# Patient Record
Sex: FEMALE | Race: WHITE | NOT HISPANIC OR LATINO
[De-identification: names, ages, dates, MRNs, and addresses within clinical notes are randomized per-mention and may not be internally consistent; named-entity substitution may affect disease eponyms.]

---

## 2020-10-29 ENCOUNTER — FORM ENCOUNTER (OUTPATIENT)
Age: 35
End: 2020-10-29

## 2020-11-05 ENCOUNTER — FORM ENCOUNTER (OUTPATIENT)
Age: 35
End: 2020-11-05

## 2020-11-29 ENCOUNTER — FORM ENCOUNTER (OUTPATIENT)
Age: 35
End: 2020-11-29

## 2020-12-10 ENCOUNTER — FORM ENCOUNTER (OUTPATIENT)
Age: 35
End: 2020-12-10

## 2021-01-17 ENCOUNTER — FORM ENCOUNTER (OUTPATIENT)
Age: 36
End: 2021-01-17

## 2021-01-26 ENCOUNTER — FORM ENCOUNTER (OUTPATIENT)
Age: 36
End: 2021-01-26

## 2021-03-18 ENCOUNTER — FORM ENCOUNTER (OUTPATIENT)
Age: 36
End: 2021-03-18

## 2021-03-31 ENCOUNTER — FORM ENCOUNTER (OUTPATIENT)
Age: 36
End: 2021-03-31

## 2021-05-05 ENCOUNTER — FORM ENCOUNTER (OUTPATIENT)
Age: 36
End: 2021-05-05

## 2021-05-19 ENCOUNTER — FORM ENCOUNTER (OUTPATIENT)
Age: 36
End: 2021-05-19

## 2021-05-26 ENCOUNTER — FORM ENCOUNTER (OUTPATIENT)
Age: 36
End: 2021-05-26

## 2021-06-02 ENCOUNTER — FORM ENCOUNTER (OUTPATIENT)
Age: 36
End: 2021-06-02

## 2021-06-03 ENCOUNTER — FORM ENCOUNTER (OUTPATIENT)
Age: 36
End: 2021-06-03

## 2021-06-04 ENCOUNTER — RESULT REVIEW (OUTPATIENT)
Age: 36
End: 2021-06-04

## 2021-06-05 ENCOUNTER — INPATIENT (INPATIENT)
Facility: HOSPITAL | Age: 36
LOS: 2 days | Discharge: HOME | End: 2021-06-08
Attending: OBSTETRICS & GYNECOLOGY | Admitting: OBSTETRICS & GYNECOLOGY
Payer: COMMERCIAL

## 2021-06-05 ENCOUNTER — OUTPATIENT (OUTPATIENT)
Dept: INPATIENT UNIT | Facility: HOSPITAL | Age: 36
LOS: 1 days | Discharge: HOME | End: 2021-06-05
Payer: COMMERCIAL

## 2021-06-05 VITALS — HEIGHT: 66 IN | WEIGHT: 160.94 LBS

## 2021-06-05 DIAGNOSIS — O47.9 FALSE LABOR, UNSPECIFIED: ICD-10-CM

## 2021-06-05 DIAGNOSIS — Z3A.00 WEEKS OF GESTATION OF PREGNANCY NOT SPECIFIED: ICD-10-CM

## 2021-06-05 LAB
ALBUMIN SERPL ELPH-MCNC: 2.8 G/DL — LOW (ref 3.3–5)
ALP SERPL-CCNC: 120 U/L — SIGNIFICANT CHANGE UP (ref 40–120)
ALT FLD-CCNC: 23 U/L — SIGNIFICANT CHANGE UP (ref 12–78)
ANION GAP SERPL CALC-SCNC: 6 MMOL/L — SIGNIFICANT CHANGE UP (ref 5–17)
AST SERPL-CCNC: 19 U/L — SIGNIFICANT CHANGE UP (ref 15–37)
BASOPHILS # BLD AUTO: 0.03 K/UL — SIGNIFICANT CHANGE UP (ref 0–0.2)
BASOPHILS NFR BLD AUTO: 0.2 % — SIGNIFICANT CHANGE UP (ref 0–2)
BILIRUB SERPL-MCNC: 0.5 MG/DL — SIGNIFICANT CHANGE UP (ref 0.2–1.2)
BUN SERPL-MCNC: 10 MG/DL — SIGNIFICANT CHANGE UP (ref 7–23)
CALCIUM SERPL-MCNC: 9.2 MG/DL — SIGNIFICANT CHANGE UP (ref 8.5–10.1)
CHLORIDE SERPL-SCNC: 106 MMOL/L — SIGNIFICANT CHANGE UP (ref 96–108)
CO2 SERPL-SCNC: 26 MMOL/L — SIGNIFICANT CHANGE UP (ref 22–31)
CREAT SERPL-MCNC: 0.51 MG/DL — SIGNIFICANT CHANGE UP (ref 0.5–1.3)
EOSINOPHIL # BLD AUTO: 0.09 K/UL — SIGNIFICANT CHANGE UP (ref 0–0.5)
EOSINOPHIL NFR BLD AUTO: 0.7 % — SIGNIFICANT CHANGE UP (ref 0–6)
GLUCOSE SERPL-MCNC: 93 MG/DL — SIGNIFICANT CHANGE UP (ref 70–99)
HCT VFR BLD CALC: 37.3 % — SIGNIFICANT CHANGE UP (ref 34.5–45)
HCT VFR BLD CALC: 38.1 % — SIGNIFICANT CHANGE UP (ref 34.5–45)
HGB BLD-MCNC: 12.7 G/DL — SIGNIFICANT CHANGE UP (ref 11.5–15.5)
HGB BLD-MCNC: 12.9 G/DL — SIGNIFICANT CHANGE UP (ref 11.5–15.5)
IMM GRANULOCYTES NFR BLD AUTO: 0.9 % — SIGNIFICANT CHANGE UP (ref 0–1.5)
LYMPHOCYTES # BLD AUTO: 1.85 K/UL — SIGNIFICANT CHANGE UP (ref 1–3.3)
LYMPHOCYTES # BLD AUTO: 14.4 % — SIGNIFICANT CHANGE UP (ref 13–44)
MCHC RBC-ENTMCNC: 30 PG — SIGNIFICANT CHANGE UP (ref 27–34)
MCHC RBC-ENTMCNC: 30 PG — SIGNIFICANT CHANGE UP (ref 27–34)
MCHC RBC-ENTMCNC: 33.9 GM/DL — SIGNIFICANT CHANGE UP (ref 32–36)
MCHC RBC-ENTMCNC: 34 GM/DL — SIGNIFICANT CHANGE UP (ref 32–36)
MCV RBC AUTO: 88.2 FL — SIGNIFICANT CHANGE UP (ref 80–100)
MCV RBC AUTO: 88.6 FL — SIGNIFICANT CHANGE UP (ref 80–100)
MONOCYTES # BLD AUTO: 1.14 K/UL — HIGH (ref 0–0.9)
MONOCYTES NFR BLD AUTO: 8.9 % — SIGNIFICANT CHANGE UP (ref 2–14)
NEUTROPHILS # BLD AUTO: 9.61 K/UL — HIGH (ref 1.8–7.4)
NEUTROPHILS NFR BLD AUTO: 74.9 % — SIGNIFICANT CHANGE UP (ref 43–77)
PLATELET # BLD AUTO: 210 K/UL — SIGNIFICANT CHANGE UP (ref 150–400)
PLATELET # BLD AUTO: 214 K/UL — SIGNIFICANT CHANGE UP (ref 150–400)
POTASSIUM SERPL-MCNC: 3.6 MMOL/L — SIGNIFICANT CHANGE UP (ref 3.5–5.3)
POTASSIUM SERPL-SCNC: 3.6 MMOL/L — SIGNIFICANT CHANGE UP (ref 3.5–5.3)
PROT SERPL-MCNC: 6.6 GM/DL — SIGNIFICANT CHANGE UP (ref 6–8.3)
RBC # BLD: 4.23 M/UL — SIGNIFICANT CHANGE UP (ref 3.8–5.2)
RBC # BLD: 4.3 M/UL — SIGNIFICANT CHANGE UP (ref 3.8–5.2)
RBC # FLD: 13.1 % — SIGNIFICANT CHANGE UP (ref 10.3–14.5)
RBC # FLD: 13.1 % — SIGNIFICANT CHANGE UP (ref 10.3–14.5)
SARS-COV-2 RNA SPEC QL NAA+PROBE: SIGNIFICANT CHANGE UP
SODIUM SERPL-SCNC: 138 MMOL/L — SIGNIFICANT CHANGE UP (ref 135–145)
WBC # BLD: 12.04 K/UL — HIGH (ref 3.8–10.5)
WBC # BLD: 12.84 K/UL — HIGH (ref 3.8–10.5)
WBC # FLD AUTO: 12.04 K/UL — HIGH (ref 3.8–10.5)
WBC # FLD AUTO: 12.84 K/UL — HIGH (ref 3.8–10.5)

## 2021-06-05 PROCEDURE — 86850 RBC ANTIBODY SCREEN: CPT

## 2021-06-05 PROCEDURE — G0463: CPT

## 2021-06-05 PROCEDURE — 36415 COLL VENOUS BLD VENIPUNCTURE: CPT

## 2021-06-05 PROCEDURE — 59050 FETAL MONITOR W/REPORT: CPT

## 2021-06-05 PROCEDURE — 86780 TREPONEMA PALLIDUM: CPT

## 2021-06-05 PROCEDURE — 88307 TISSUE EXAM BY PATHOLOGIST: CPT

## 2021-06-05 PROCEDURE — 59025 FETAL NON-STRESS TEST: CPT

## 2021-06-05 PROCEDURE — 85027 COMPLETE CBC AUTOMATED: CPT

## 2021-06-05 PROCEDURE — 85025 COMPLETE CBC W/AUTO DIFF WBC: CPT

## 2021-06-05 PROCEDURE — 86901 BLOOD TYPING SEROLOGIC RH(D): CPT

## 2021-06-05 PROCEDURE — 86769 SARS-COV-2 COVID-19 ANTIBODY: CPT

## 2021-06-05 PROCEDURE — 80053 COMPREHEN METABOLIC PANEL: CPT

## 2021-06-05 PROCEDURE — 76805 OB US >/= 14 WKS SNGL FETUS: CPT

## 2021-06-05 PROCEDURE — 88307 TISSUE EXAM BY PATHOLOGIST: CPT | Mod: 26

## 2021-06-05 PROCEDURE — 87635 SARS-COV-2 COVID-19 AMP PRB: CPT

## 2021-06-05 PROCEDURE — 76805 OB US >/= 14 WKS SNGL FETUS: CPT | Mod: 26

## 2021-06-05 PROCEDURE — 86900 BLOOD TYPING SEROLOGIC ABO: CPT

## 2021-06-05 RX ORDER — SODIUM CHLORIDE 9 MG/ML
1000 INJECTION, SOLUTION INTRAVENOUS ONCE
Refills: 0 | Status: COMPLETED | OUTPATIENT
Start: 2021-06-05 | End: 2021-06-05

## 2021-06-05 RX ORDER — ENOXAPARIN SODIUM 100 MG/ML
40 INJECTION SUBCUTANEOUS EVERY 24 HOURS
Refills: 0 | Status: DISCONTINUED | OUTPATIENT
Start: 2021-06-06 | End: 2021-06-08

## 2021-06-05 RX ORDER — MAGNESIUM HYDROXIDE 400 MG/1
30 TABLET, CHEWABLE ORAL
Refills: 0 | Status: DISCONTINUED | OUTPATIENT
Start: 2021-06-05 | End: 2021-06-08

## 2021-06-05 RX ORDER — LANOLIN
1 OINTMENT (GRAM) TOPICAL EVERY 6 HOURS
Refills: 0 | Status: DISCONTINUED | OUTPATIENT
Start: 2021-06-05 | End: 2021-06-08

## 2021-06-05 RX ORDER — ACETAMINOPHEN 500 MG
975 TABLET ORAL
Refills: 0 | Status: DISCONTINUED | OUTPATIENT
Start: 2021-06-05 | End: 2021-06-08

## 2021-06-05 RX ORDER — OXYCODONE HYDROCHLORIDE 5 MG/1
5 TABLET ORAL ONCE
Refills: 0 | Status: DISCONTINUED | OUTPATIENT
Start: 2021-06-05 | End: 2021-06-08

## 2021-06-05 RX ORDER — VANCOMYCIN HCL 1 G
1500 VIAL (EA) INTRAVENOUS ONCE
Refills: 0 | Status: COMPLETED | OUTPATIENT
Start: 2021-06-05 | End: 2021-06-05

## 2021-06-05 RX ORDER — NALOXONE HYDROCHLORIDE 4 MG/.1ML
0.1 SPRAY NASAL
Refills: 0 | Status: DISCONTINUED | OUTPATIENT
Start: 2021-06-05 | End: 2021-06-08

## 2021-06-05 RX ORDER — OXYCODONE HYDROCHLORIDE 5 MG/1
5 TABLET ORAL
Refills: 0 | Status: DISCONTINUED | OUTPATIENT
Start: 2021-06-05 | End: 2021-06-08

## 2021-06-05 RX ORDER — IBUPROFEN 200 MG
600 TABLET ORAL EVERY 6 HOURS
Refills: 0 | Status: COMPLETED | OUTPATIENT
Start: 2021-06-05 | End: 2022-05-04

## 2021-06-05 RX ORDER — KETOROLAC TROMETHAMINE 30 MG/ML
30 SYRINGE (ML) INJECTION EVERY 6 HOURS
Refills: 0 | Status: DISCONTINUED | OUTPATIENT
Start: 2021-06-05 | End: 2021-06-06

## 2021-06-05 RX ORDER — OXYTOCIN 10 UNIT/ML
333.33 VIAL (ML) INJECTION
Qty: 20 | Refills: 0 | Status: DISCONTINUED | OUTPATIENT
Start: 2021-06-05 | End: 2021-06-08

## 2021-06-05 RX ORDER — ACETAMINOPHEN 500 MG
1000 TABLET ORAL ONCE
Refills: 0 | Status: COMPLETED | OUTPATIENT
Start: 2021-06-05 | End: 2021-06-05

## 2021-06-05 RX ORDER — SODIUM CHLORIDE 9 MG/ML
1000 INJECTION, SOLUTION INTRAVENOUS
Refills: 0 | Status: DISCONTINUED | OUTPATIENT
Start: 2021-06-05 | End: 2021-06-06

## 2021-06-05 RX ORDER — ONDANSETRON 8 MG/1
4 TABLET, FILM COATED ORAL EVERY 6 HOURS
Refills: 0 | Status: DISCONTINUED | OUTPATIENT
Start: 2021-06-05 | End: 2021-06-08

## 2021-06-05 RX ORDER — DIPHENHYDRAMINE HCL 50 MG
25 CAPSULE ORAL EVERY 6 HOURS
Refills: 0 | Status: DISCONTINUED | OUTPATIENT
Start: 2021-06-05 | End: 2021-06-08

## 2021-06-05 RX ORDER — KETOROLAC TROMETHAMINE 30 MG/ML
15 SYRINGE (ML) INJECTION EVERY 6 HOURS
Refills: 0 | Status: DISCONTINUED | OUTPATIENT
Start: 2021-06-05 | End: 2021-06-05

## 2021-06-05 RX ORDER — CITRIC ACID/SODIUM CITRATE 300-500 MG
30 SOLUTION, ORAL ORAL ONCE
Refills: 0 | Status: COMPLETED | OUTPATIENT
Start: 2021-06-05 | End: 2021-06-05

## 2021-06-05 RX ORDER — SIMETHICONE 80 MG/1
80 TABLET, CHEWABLE ORAL EVERY 4 HOURS
Refills: 0 | Status: DISCONTINUED | OUTPATIENT
Start: 2021-06-05 | End: 2021-06-08

## 2021-06-05 RX ORDER — OXYCODONE HYDROCHLORIDE 5 MG/1
10 TABLET ORAL
Refills: 0 | Status: DISCONTINUED | OUTPATIENT
Start: 2021-06-05 | End: 2021-06-08

## 2021-06-05 RX ORDER — TETANUS TOXOID, REDUCED DIPHTHERIA TOXOID AND ACELLULAR PERTUSSIS VACCINE, ADSORBED 5; 2.5; 8; 8; 2.5 [IU]/.5ML; [IU]/.5ML; UG/.5ML; UG/.5ML; UG/.5ML
0.5 SUSPENSION INTRAMUSCULAR ONCE
Refills: 0 | Status: DISCONTINUED | OUTPATIENT
Start: 2021-06-05 | End: 2021-06-08

## 2021-06-05 RX ORDER — FAMOTIDINE 10 MG/ML
20 INJECTION INTRAVENOUS ONCE
Refills: 0 | Status: COMPLETED | OUTPATIENT
Start: 2021-06-05 | End: 2021-06-05

## 2021-06-05 RX ORDER — VANCOMYCIN HCL 1 G
2000 VIAL (EA) INTRAVENOUS ONCE
Refills: 0 | Status: DISCONTINUED | OUTPATIENT
Start: 2021-06-05 | End: 2021-06-05

## 2021-06-05 RX ORDER — AZITHROMYCIN 500 MG/1
500 TABLET, FILM COATED ORAL ONCE
Refills: 0 | Status: COMPLETED | OUTPATIENT
Start: 2021-06-05 | End: 2021-06-05

## 2021-06-05 RX ORDER — SODIUM CHLORIDE 9 MG/ML
1000 INJECTION, SOLUTION INTRAVENOUS
Refills: 0 | Status: DISCONTINUED | OUTPATIENT
Start: 2021-06-05 | End: 2021-06-08

## 2021-06-05 RX ORDER — HYDROMORPHONE HYDROCHLORIDE 2 MG/ML
1 INJECTION INTRAMUSCULAR; INTRAVENOUS; SUBCUTANEOUS
Refills: 0 | Status: DISCONTINUED | OUTPATIENT
Start: 2021-06-05 | End: 2021-06-08

## 2021-06-05 RX ADMIN — HYDROMORPHONE HYDROCHLORIDE 1 MILLIGRAM(S): 2 INJECTION INTRAMUSCULAR; INTRAVENOUS; SUBCUTANEOUS at 23:40

## 2021-06-05 RX ADMIN — SODIUM CHLORIDE 2000 MILLILITER(S): 9 INJECTION, SOLUTION INTRAVENOUS at 21:19

## 2021-06-05 RX ADMIN — FAMOTIDINE 20 MILLIGRAM(S): 10 INJECTION INTRAVENOUS at 21:40

## 2021-06-05 RX ADMIN — Medication 300 MILLIGRAM(S): at 21:36

## 2021-06-05 RX ADMIN — Medication 400 MILLIGRAM(S): at 23:51

## 2021-06-05 RX ADMIN — HYDROMORPHONE HYDROCHLORIDE 1 MILLIGRAM(S): 2 INJECTION INTRAMUSCULAR; INTRAVENOUS; SUBCUTANEOUS at 23:23

## 2021-06-05 RX ADMIN — Medication 30 MILLILITER(S): at 21:39

## 2021-06-06 ENCOUNTER — TRANSCRIPTION ENCOUNTER (OUTPATIENT)
Age: 36
End: 2021-06-06

## 2021-06-06 DIAGNOSIS — O26.619 LIVER AND BILIARY TRACT DISORDERS IN PREGNANCY, UNSPECIFIED TRIMESTER: ICD-10-CM

## 2021-06-06 LAB
BASOPHILS # BLD AUTO: 0.05 K/UL — SIGNIFICANT CHANGE UP (ref 0–0.2)
BASOPHILS NFR BLD AUTO: 0.2 % — SIGNIFICANT CHANGE UP (ref 0–2)
COVID-19 SPIKE DOMAIN AB INTERP: NEGATIVE — SIGNIFICANT CHANGE UP
COVID-19 SPIKE DOMAIN ANTIBODY RESULT: 0.4 U/ML — SIGNIFICANT CHANGE UP
EOSINOPHIL # BLD AUTO: 0 K/UL — SIGNIFICANT CHANGE UP (ref 0–0.5)
EOSINOPHIL NFR BLD AUTO: 0 % — SIGNIFICANT CHANGE UP (ref 0–6)
HCT VFR BLD CALC: 37.1 % — SIGNIFICANT CHANGE UP (ref 34.5–45)
HGB BLD-MCNC: 12.6 G/DL — SIGNIFICANT CHANGE UP (ref 11.5–15.5)
IMM GRANULOCYTES NFR BLD AUTO: 0.8 % — SIGNIFICANT CHANGE UP (ref 0–1.5)
LYMPHOCYTES # BLD AUTO: 0.97 K/UL — LOW (ref 1–3.3)
LYMPHOCYTES # BLD AUTO: 4.2 % — LOW (ref 13–44)
MCHC RBC-ENTMCNC: 29.7 PG — SIGNIFICANT CHANGE UP (ref 27–34)
MCHC RBC-ENTMCNC: 34 GM/DL — SIGNIFICANT CHANGE UP (ref 32–36)
MCV RBC AUTO: 87.5 FL — SIGNIFICANT CHANGE UP (ref 80–100)
MONOCYTES # BLD AUTO: 0.94 K/UL — HIGH (ref 0–0.9)
MONOCYTES NFR BLD AUTO: 4.1 % — SIGNIFICANT CHANGE UP (ref 2–14)
NEUTROPHILS # BLD AUTO: 20.71 K/UL — HIGH (ref 1.8–7.4)
NEUTROPHILS NFR BLD AUTO: 90.7 % — HIGH (ref 43–77)
PLATELET # BLD AUTO: 206 K/UL — SIGNIFICANT CHANGE UP (ref 150–400)
RBC # BLD: 4.24 M/UL — SIGNIFICANT CHANGE UP (ref 3.8–5.2)
RBC # FLD: 12.8 % — SIGNIFICANT CHANGE UP (ref 10.3–14.5)
SARS-COV-2 IGG+IGM SERPL QL IA: 0.4 U/ML — SIGNIFICANT CHANGE UP
SARS-COV-2 IGG+IGM SERPL QL IA: NEGATIVE — SIGNIFICANT CHANGE UP
WBC # BLD: 22.85 K/UL — HIGH (ref 3.8–10.5)
WBC # FLD AUTO: 22.85 K/UL — HIGH (ref 3.8–10.5)

## 2021-06-06 RX ORDER — IBUPROFEN 200 MG
600 TABLET ORAL EVERY 6 HOURS
Refills: 0 | Status: DISCONTINUED | OUTPATIENT
Start: 2021-06-06 | End: 2021-06-08

## 2021-06-06 RX ADMIN — Medication 30 MILLIGRAM(S): at 14:59

## 2021-06-06 RX ADMIN — Medication 30 MILLIGRAM(S): at 21:40

## 2021-06-06 RX ADMIN — ENOXAPARIN SODIUM 40 MILLIGRAM(S): 100 INJECTION SUBCUTANEOUS at 12:28

## 2021-06-06 RX ADMIN — Medication 30 MILLIGRAM(S): at 08:49

## 2021-06-06 RX ADMIN — Medication 1000 MILLIGRAM(S): at 00:07

## 2021-06-06 RX ADMIN — Medication 30 MILLIGRAM(S): at 21:10

## 2021-06-06 RX ADMIN — Medication 975 MILLIGRAM(S): at 17:30

## 2021-06-06 RX ADMIN — SODIUM CHLORIDE 125 MILLILITER(S): 9 INJECTION, SOLUTION INTRAVENOUS at 04:07

## 2021-06-06 RX ADMIN — Medication 1 TABLET(S): at 12:28

## 2021-06-06 RX ADMIN — Medication 30 MILLIGRAM(S): at 01:35

## 2021-06-06 RX ADMIN — Medication 975 MILLIGRAM(S): at 17:50

## 2021-06-06 RX ADMIN — Medication 30 MILLIGRAM(S): at 09:30

## 2021-06-06 RX ADMIN — Medication 975 MILLIGRAM(S): at 12:28

## 2021-06-06 RX ADMIN — Medication 30 MILLIGRAM(S): at 01:15

## 2021-06-06 RX ADMIN — Medication 975 MILLIGRAM(S): at 12:29

## 2021-06-06 RX ADMIN — Medication 30 MILLIGRAM(S): at 14:57

## 2021-06-06 NOTE — DISCHARGE NOTE OB - CARE PROVIDER_API CALL
Emmanuel Talbert)  Obstetrics and Gynecology  [address] [place] [address]  Phone: [phone]  Fax: [phone]  Established Patient  Follow Up Time:

## 2021-06-06 NOTE — PROGRESS NOTE ADULT - ATTENDING COMMENTS
POD#1 s/p csection.   vs wnl  abdomen soft, dressing clean dry  minimal lochial flow   no calf tenderness  continue same management

## 2021-06-06 NOTE — DISCHARGE NOTE OB - CARE PLAN
Principal Discharge DX:	 delivery delivered  Goal:	recovery  Assessment and plan of treatment:	discussed  Secondary Diagnosis:	Intrahepatic cholestasis of pregnancy  Goal:	recovery  Assessment and plan of treatment:	discussed

## 2021-06-06 NOTE — PROGRESS NOTE ADULT - ASSESSMENT
A/P:  36yo   now POD#1 s/p urgent primary  section 2/ to breech presentation in labor at 37 5/7 weeks gestation complicated by ineffective spinal anesthesia converted to general anesthesia. This pregnancy was complicated by suspected intrahepatic cholestasis of pregnancy  -Vital Signs Stable  -Voiding, tolerating PO, bowel function nml   -Advance care as tolerated   -Continue routine postpartum/postoperative care and education.  -Healthy female infant A/P:  36yo   now POD#1 s/p urgent primary  section 2/ to breech presentation in labor at 37 5/7 weeks gestation complicated by ineffective spinal anesthesia converted to general anesthesia. This pregnancy was complicated by suspected intrahepatic cholestasis of pregnancy  -Vital Signs Stable  -Void pending, tolerating PO fluids, will attempt regular diet this AM, bowel function nml   -Advance care as tolerated   -Continue routine postpartum/postoperative care and education.  -Healthy female infant A/P:  34yo   now POD#1 s/p urgent primary  section 2/ to breech presentation in labor at 37 5/7 weeks gestation complicated by ineffective spinal anesthesia converted to general anesthesia. This pregnancy was complicated by suspected intrahepatic cholestasis of pregnancy  -Vital Signs Stable  -Void pending, tolerating PO fluids, will attempt regular diet this AM, bowel function nml   -Advance care as tolerated   -Continue routine postpartum/postoperative care and education.  -Healthy female infant    Agree with above

## 2021-06-06 NOTE — DISCHARGE NOTE OB - PATIENT PORTAL LINK FT
You can access the FollowMyHealth Patient Portal offered by [facility] by registering at the following website: [email or web address]. By joining [employer]’s FollowMyHealth portal, you will also be able to view your health information using other applications (apps) compatible with our system.

## 2021-06-06 NOTE — PROGRESS NOTE ADULT - SUBJECTIVE AND OBJECTIVE BOX
ELSA REYES is a 34yo   now POD#1 s/p urgent primary  section / to breech presentation in labor at 37 5/7 weeks gestation complicated by ineffective spinal anesthesia converted to general anesthesia. This pregnancy was complicated by suspected intrahepatic cholestasis of pregnancy    S:    The patient has no complaints. Pain controlled with medication   She is not yet ambulating.   She is tolerating PO fluids, but has not attempted a regular diet  - flatus/-BM  Foster catheter removed, void pending     O:    T(C): 37.1 (21 @ 06:30), Max: 37.1 (21 @ 06:30)  HR: 65 (21 @ 06:30) (60 - 85)  BP: 90/50 (21 @ 06:30) (90/50 - 112/67)  RR: 16 (21 @ 06:30) (16 - 20)  SpO2: 99% (21 @ 06:30) (97% - 100%)  Wt(kg): --    Gen: NAD, AOx3  CV: RRR  Pulm: CTAB  Breast: nontender, not engorged   Abdomen:  soft, appropriately tender, non-distended, +bowel sounds.  Uterus:  Fundus firm below umbilicus  Incision:  Clean/dry/intact with dermabond in place  VE:  +lochia  Ext:  Non-tender, no edema                           12.6   22.85 )-----------( 206      ( 2021 07:18 )             37.1     06-05    138  |  106  |  10  ----------------------------<  93  3.6   |  26  |  0.51    Ca    9.2      2021 02:07    TPro  6.6  /  Alb  2.8<L>  /  TBili  0.5  /  DBili  x   /  AST  19  /  ALT  23  /  AlkPhos  120  06-05         ELSA REYES is a 36yo   now POD#1 s/p urgent primary  section / to breech presentation in labor at 37 5/7 weeks gestation complicated by ineffective spinal anesthesia converted to general anesthesia. This pregnancy was complicated by suspected intrahepatic cholestasis of pregnancy    S:    The patient has no complaints. Pain controlled with medication   She is not yet ambulating.   She is tolerating PO fluids, but has not attempted a regular diet  - flatus/-BM  Foster catheter removed, void pending     O:    T(C): 37.1 (21 @ 06:30), Max: 37.1 (21 @ 06:30)  HR: 65 (21 @ 06:30) (60 - 85)  BP: 90/50 (21 @ 06:30) (90/50 - 112/67)  RR: 16 (21 @ 06:30) (16 - 20)  SpO2: 99% (21 @ 06:30) (97% - 100%)  Wt(kg): --    Gen: NAD, AOx3  CV: RRR  Pulm: CTAB  Breast: nontender, not engorged   Abdomen:  soft, appropriately tender, non-distended, +bowel sounds.  Uterus:  Fundus firm below umbilicus  Incision:  Clean/dry/intact with dermabond in place  VE:  +lochia  Ext:  Non-tender, no edema                           12.6   22.85 )-----------( 206      ( 2021 07:18 )             37.1     06-05    138  |  106  |  10  ----------------------------<  93  3.6   |  26  |  0.51    Ca    9.2      2021 02:07    TPro  6.6  /  Alb  2.8<L>  /  TBili  0.5  /  DBili  x   /  AST  19  /  ALT  23  /  AlkPhos  120  06-05

## 2021-06-07 LAB — T PALLIDUM AB TITR SER: NEGATIVE — SIGNIFICANT CHANGE UP

## 2021-06-07 RX ADMIN — Medication 600 MILLIGRAM(S): at 09:21

## 2021-06-07 RX ADMIN — Medication 600 MILLIGRAM(S): at 21:00

## 2021-06-07 RX ADMIN — Medication 975 MILLIGRAM(S): at 06:33

## 2021-06-07 RX ADMIN — Medication 975 MILLIGRAM(S): at 15:32

## 2021-06-07 RX ADMIN — Medication 600 MILLIGRAM(S): at 03:37

## 2021-06-07 RX ADMIN — Medication 975 MILLIGRAM(S): at 00:05

## 2021-06-07 RX ADMIN — Medication 975 MILLIGRAM(S): at 01:00

## 2021-06-07 RX ADMIN — ENOXAPARIN SODIUM 40 MILLIGRAM(S): 100 INJECTION SUBCUTANEOUS at 09:18

## 2021-06-07 RX ADMIN — Medication 600 MILLIGRAM(S): at 04:30

## 2021-06-07 RX ADMIN — Medication 975 MILLIGRAM(S): at 07:24

## 2021-06-07 RX ADMIN — Medication 600 MILLIGRAM(S): at 21:25

## 2021-06-07 RX ADMIN — Medication 600 MILLIGRAM(S): at 09:18

## 2021-06-07 RX ADMIN — Medication 600 MILLIGRAM(S): at 14:50

## 2021-06-07 RX ADMIN — Medication 600 MILLIGRAM(S): at 14:22

## 2021-06-07 RX ADMIN — Medication 1 TABLET(S): at 09:18

## 2021-06-07 NOTE — PROGRESS NOTE ADULT - SUBJECTIVE AND OBJECTIVE BOX
Postpartum Note,  Section  She is a  35y woman who is now post-operative day:2     Subjective:  The patient feels well.  She is ambulating.   She is tolerating regular diet.  She denies nausea and vomiting.  She is voiding.  Her pain is controlled.  She reports normal postpartum bleeding.  She is breastfeeding.  She is formula feeding.    Physical exam:    Vital Signs Last 24 Hrs  T(C): 36.7 (2021 00:13), Max: 36.8 (2021 20:00)  T(F): 98 (2021 00:13), Max: 98.2 (2021 20:00)  HR: 74 (2021 00:13) (74 - 81)  BP: 107/56 (2021 00:13) (100/56 - 107/56)  BP(mean): --  RR: 18 (2021 00:13) (16 - 18)  SpO2: 98% (2021 00:13) (98% - 100%)    Gen: NAD  Breast: Soft, nontender, not engorged.  Abdomen: Soft, nontender, no distension , firm uterine fundus at umbilicus.  Incision: Clean, dry, and intact with steri strips  Pelvic: Normal lochia noted  Ext: No calf tenderness    LABS:                        12.6   22.85 )-----------( 206      ( 2021 07:18 )             37.1     penicillins (Unknown)    Intolerances      MEDICATIONS  (STANDING):  acetaminophen   Tablet .. 975 milliGRAM(s) Oral <User Schedule>  diphtheria/tetanus/pertussis (acellular) Vaccine (ADAcel) 0.5 milliLiter(s) IntraMuscular once  enoxaparin Injectable 40 milliGRAM(s) SubCutaneous every 24 hours  ibuprofen  Tablet. 600 milliGRAM(s) Oral every 6 hours  lactated ringers. 1000 milliLiter(s) (125 mL/Hr) IV Continuous <Continuous>  oxytocin Infusion 333.333 milliUNIT(s)/Min (1000 mL/Hr) IV Continuous <Continuous>  oxytocin Infusion 333.333 milliUNIT(s)/Min (1000 mL/Hr) IV Continuous <Continuous>  prenatal multivitamin 1 Tablet(s) Oral daily    MEDICATIONS  (PRN):  diphenhydrAMINE 25 milliGRAM(s) Oral every 6 hours PRN Pruritus  HYDROmorphone  Injectable 1 milliGRAM(s) IV Push every 3 hours PRN Severe Pain (7 - 10)  lanolin Ointment 1 Application(s) Topical every 6 hours PRN Sore Nipples  magnesium hydroxide Suspension 30 milliLiter(s) Oral two times a day PRN Constipation  naloxone Injectable 0.1 milliGRAM(s) IV Push every 3 minutes PRN For ANY of the following changes in patient status:  A. RR LESS THAN 10 breaths per minute, B. Oxygen saturation LESS THAN 90%, C. Sedation score of 6  ondansetron Injectable 4 milliGRAM(s) IV Push every 6 hours PRN Nausea  oxyCODONE    IR 5 milliGRAM(s) Oral every 3 hours PRN Mild Pain (1 - 3)  oxyCODONE    IR 10 milliGRAM(s) Oral every 3 hours PRN Moderate Pain (4 - 6)  oxyCODONE    IR 5 milliGRAM(s) Oral every 3 hours PRN Moderate to Severe Pain (4-10)  oxyCODONE    IR 5 milliGRAM(s) Oral once PRN Moderate to Severe Pain (4-10)  simethicone 80 milliGRAM(s) Chew every 4 hours PRN Gas        Assessment and Plan  POD #2 s/p C/S for breech under general anesthesia  Doing well.  Encourage ambulation.

## 2021-06-08 VITALS
TEMPERATURE: 98 F | SYSTOLIC BLOOD PRESSURE: 111 MMHG | DIASTOLIC BLOOD PRESSURE: 72 MMHG | OXYGEN SATURATION: 98 % | HEART RATE: 83 BPM | RESPIRATION RATE: 16 BRPM

## 2021-06-08 RX ADMIN — Medication 600 MILLIGRAM(S): at 10:10

## 2021-06-08 RX ADMIN — Medication 1 TABLET(S): at 09:36

## 2021-06-08 RX ADMIN — Medication 975 MILLIGRAM(S): at 00:27

## 2021-06-08 RX ADMIN — Medication 975 MILLIGRAM(S): at 06:35

## 2021-06-08 RX ADMIN — Medication 600 MILLIGRAM(S): at 09:36

## 2021-06-08 RX ADMIN — Medication 975 MILLIGRAM(S): at 00:06

## 2021-06-08 RX ADMIN — Medication 975 MILLIGRAM(S): at 06:10

## 2021-06-08 NOTE — PROGRESS NOTE ADULT - SUBJECTIVE AND OBJECTIVE BOX
Postpartum Note,  Section  She is a  35y woman who is now post-operative day: #2    Subjective:  The patient feels well.  She is ambulating without difficulty.  She is tolerating PO.  She is voiding.  She denies nausea and vomiting.  Her pain is controlled.  She reports normal postpartum bleeding  She is breastfeeding.    Physical exam:    Vital Signs Last 24 Hrs  T(C): 36.7 (2021 08:02), Max: 36.9 (2021 23:00)  T(F): 98.1 (2021 08:02), Max: 98.4 (2021 23:00)  HR: 83 (2021 08:02) (83 - 92)  BP: 111/72 (2021 08:02) (102/75 - 111/72)  BP(mean): --  RR: 16 (2021 08:02) (16 - 16)  SpO2: 98% (2021 08:02) (98% - 98%)    Gen: NAD  Breast: Soft, nontender, non engorged  Abdomen: Soft, nontender, no distension , firm uterine fundus at umbilicus.  Incision: Clean, dry, and intact  Pelvic: Normal lochia noted  Ext: No calf tenderness    LABS:    Allergies    penicillins (Unknown)    Intolerances      MEDICATIONS  (STANDING):  acetaminophen   Tablet .. 975 milliGRAM(s) Oral <User Schedule>  diphtheria/tetanus/pertussis (acellular) Vaccine (ADAcel) 0.5 milliLiter(s) IntraMuscular once  enoxaparin Injectable 40 milliGRAM(s) SubCutaneous every 24 hours  ibuprofen  Tablet. 600 milliGRAM(s) Oral every 6 hours  lactated ringers. 1000 milliLiter(s) (125 mL/Hr) IV Continuous <Continuous>  oxytocin Infusion 333.333 milliUNIT(s)/Min (1000 mL/Hr) IV Continuous <Continuous>  oxytocin Infusion 333.333 milliUNIT(s)/Min (1000 mL/Hr) IV Continuous <Continuous>  prenatal multivitamin 1 Tablet(s) Oral daily    MEDICATIONS  (PRN):  diphenhydrAMINE 25 milliGRAM(s) Oral every 6 hours PRN Pruritus  HYDROmorphone  Injectable 1 milliGRAM(s) IV Push every 3 hours PRN Severe Pain (7 - 10)  lanolin Ointment 1 Application(s) Topical every 6 hours PRN Sore Nipples  magnesium hydroxide Suspension 30 milliLiter(s) Oral two times a day PRN Constipation  naloxone Injectable 0.1 milliGRAM(s) IV Push every 3 minutes PRN For ANY of the following changes in patient status:  A. RR LESS THAN 10 breaths per minute, B. Oxygen saturation LESS THAN 90%, C. Sedation score of 6  ondansetron Injectable 4 milliGRAM(s) IV Push every 6 hours PRN Nausea  oxyCODONE    IR 5 milliGRAM(s) Oral every 3 hours PRN Mild Pain (1 - 3)  oxyCODONE    IR 10 milliGRAM(s) Oral every 3 hours PRN Moderate Pain (4 - 6)  oxyCODONE    IR 5 milliGRAM(s) Oral every 3 hours PRN Moderate to Severe Pain (4-10)  oxyCODONE    IR 5 milliGRAM(s) Oral once PRN Moderate to Severe Pain (4-10)  simethicone 80 milliGRAM(s) Chew every 4 hours PRN Gas        Assessment and Plan  POD # 2 s/p C/S  Doing well.  Encourage ambulation.  Discharge home today.  F/U in 2 weeks for incision check.  Call for fevers, chills, nausea, vomiting, heavy vaginal bleeding, vaginal discharge, severe pain, symptoms of depression, problems with incision or any other concerning symptoms.  Nothing in vagina and no heavy lifting x 6 weeks.  No driving x 2 weeks.

## 2021-06-14 ENCOUNTER — FORM ENCOUNTER (OUTPATIENT)
Age: 36
End: 2021-06-14

## 2021-06-14 DIAGNOSIS — Z3A.37 37 WEEKS GESTATION OF PREGNANCY: ICD-10-CM

## 2021-06-14 DIAGNOSIS — Z88.0 ALLERGY STATUS TO PENICILLIN: ICD-10-CM

## 2021-06-14 DIAGNOSIS — K83.1 OBSTRUCTION OF BILE DUCT: ICD-10-CM

## 2021-06-14 DIAGNOSIS — O34.29 MATERNAL CARE DUE TO UTERINE SCAR FROM OTHER PREVIOUS SURGERY: ICD-10-CM

## 2021-12-28 PROBLEM — Z00.00 ENCOUNTER FOR PREVENTIVE HEALTH EXAMINATION: Status: ACTIVE | Noted: 2021-12-28

## 2021-12-30 ENCOUNTER — APPOINTMENT (OUTPATIENT)
Dept: PEDIATRIC ALLERGY IMMUNOLOGY | Facility: CLINIC | Age: 36
End: 2021-12-30
Payer: COMMERCIAL

## 2021-12-30 VITALS
RESPIRATION RATE: 16 BRPM | HEART RATE: 90 BPM | OXYGEN SATURATION: 98 % | DIASTOLIC BLOOD PRESSURE: 85 MMHG | SYSTOLIC BLOOD PRESSURE: 125 MMHG | HEIGHT: 65 IN | TEMPERATURE: 98.1 F | WEIGHT: 153 LBS | BODY MASS INDEX: 25.49 KG/M2

## 2021-12-30 DIAGNOSIS — J30.9 ALLERGIC RHINITIS, UNSPECIFIED: ICD-10-CM

## 2021-12-30 DIAGNOSIS — L50.8 OTHER URTICARIA: ICD-10-CM

## 2021-12-30 PROCEDURE — 95004 PERQ TESTS W/ALRGNC XTRCS: CPT

## 2021-12-30 PROCEDURE — 99203 OFFICE O/P NEW LOW 30 MIN: CPT | Mod: 25

## 2021-12-30 RX ORDER — PNV NO.95/FERROUS FUM/FOLIC AC 28MG-0.8MG
TABLET ORAL
Refills: 0 | Status: ACTIVE | COMMUNITY

## 2021-12-30 RX ORDER — FEXOFENADINE HCL 180 MG
180 TABLET ORAL
Refills: 0 | Status: ACTIVE | COMMUNITY

## 2021-12-30 RX ORDER — ALBUTEROL SULFATE 90 UG/1
108 (90 BASE) INHALANT RESPIRATORY (INHALATION)
Refills: 0 | Status: ACTIVE | COMMUNITY

## 2021-12-30 NOTE — REASON FOR VISIT
[Initial Evaluation] : an initial evaluation of [Congestion] : congestion [Runny Nose] : runny nose [Itchy Eyes] : itchy eyes [Red Eyes] : red eyes [To Medication] : allergy to medication [Asthma] : asthma [Wheezing] : wheezing [Shortness of Breath] : shortness of breath [Hives] : hives

## 2021-12-30 NOTE — SOCIAL HISTORY
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

## 2021-12-30 NOTE — ASSESSMENT
I reviewed 's note with the pt's daughter. She prefers to have appt. appt given for tomorrow. Daughter knows that if the diarrhea worsens that pt will need to go to the ER. She agreed with this plan. Advised increased fluids and rest in the meantime. [FreeTextEntry1] : 36 yr old with new onset post pregnancy urticaria and dermatographia\par Diminished with Allegra 360 mg qd\par Increase AR from dog dander\par Previous eval from Dr. Douglass was normal\par \par Will send Imunocaps for Dog\par Will sent urticaria autoimmune eval\par Encouraged to receive COVID vaccination\par \par Pt to call in 2-3 weeks to let me know how she is doing. \par \par \par Total MD time spent on this encounter was 35 minutes.  This includes time devoted to preparing to see the patient with review of previous medical record, obtaining medical history, performing physical exam, counseling and patient education with patient and family, ordering medications and lab studies, documentation in the medical record and coordination of care.\par

## 2021-12-30 NOTE — PHYSICAL EXAM
[Alert] : alert [Well Nourished] : well nourished [No Discharge] : no discharge [Normal TMs] : both tympanic membranes were normal [No Thrush] : no thrush [Boggy Nasal Turbinates] : no boggy and/or pale nasal turbinates [Posterior Pharyngeal Cobblestoning] : no posterior pharyngeal cobblestoning [No Neck Mass] : no neck mass was observed [Normal Rate and Effort] : normal respiratory rhythm and effort [Wheezing] : no wheezing was heard [Normal Rate] : heart rate was normal  [Normal Cervical Lymph Nodes] : cervical [Skin Intact] : skin intact  [No Joint Swelling or Erythema] : no joint swelling or erythema

## 2021-12-30 NOTE — HISTORY OF PRESENT ILLNESS
[de-identified] : 36 yr  old with new onset chronic urticaria since delivery of child about 7 months ago. Hives started about 3 months after deliver and have been qd in nature. Digital images provided by patient were consistent with urticaria and dermatographia. She started Allegra 360 mg qAM and has done well but if she missed a dose hives return. Mild increase AR is also noted with 2 dogs at home with sneezing, rhinitis, post nasal drip and occasional wheezing. She was given an albuterol inhaler which she uses intermittently.,\par She saw Dr. Douglass who did some blood work but she does not know the results\par She has not had COVID and not the COVID vaccine

## 2022-03-07 ENCOUNTER — FORM ENCOUNTER (OUTPATIENT)
Age: 37
End: 2022-03-07

## 2022-07-06 ENCOUNTER — APPOINTMENT (OUTPATIENT)
Dept: OBGYN | Facility: CLINIC | Age: 37
End: 2022-07-06

## 2022-07-06 VITALS
RESPIRATION RATE: 12 BRPM | HEART RATE: 89 BPM | SYSTOLIC BLOOD PRESSURE: 120 MMHG | DIASTOLIC BLOOD PRESSURE: 78 MMHG | HEIGHT: 65 IN | WEIGHT: 150 LBS | BODY MASS INDEX: 24.99 KG/M2

## 2022-07-06 LAB — HCG UR QL: NEGATIVE

## 2022-07-06 PROCEDURE — 81025 URINE PREGNANCY TEST: CPT

## 2022-07-06 PROCEDURE — 57454 BX/CURETT OF CERVIX W/SCOPE: CPT

## 2022-07-06 NOTE — PROCEDURE
[Colposcopy] : Colposcopy  [Time out performed] : Pre-procedure time out performed.  Patient's name, date of birth and procedure confirmed. [Consent Obtained] : Consent obtained [Risks] : risks [Benefits] : benefits [Alternatives] : alternatives [Patient] : patient [Infection] : infection [Bleeding] : bleeding [Allergic Reaction] : allergic reaction [Abnormal Pap] : abnormal pap [Ibuprofen ___mg] : Ibuprofen ~Vmg [Colposcopy Adequate] : colposcopy adequate [Pap Performed] : pap not performed [SCI Fully Visualized] : SCI fully visualized [No Abnormalities] : no abnormalities [Lesion] : lesion seen [Biopsy] : biopsy taken [Hemostasis Obtained] : Hemostasis obtained [Tolerated Well] : the patient tolerated the procedure well [de-identified] : 1 [de-identified] : mild changes noted [de-identified] : 9412 [de-identified] : mild changes [de-identified] : mild changes\par check ECC and biopsy

## 2022-07-28 ENCOUNTER — NON-APPOINTMENT (OUTPATIENT)
Age: 37
End: 2022-07-28

## 2022-07-28 LAB — CORE LAB BIOPSY: NORMAL

## 2022-08-04 ENCOUNTER — NON-APPOINTMENT (OUTPATIENT)
Age: 37
End: 2022-08-04

## 2022-08-04 LAB
ALBUMIN SERPL ELPH-MCNC: 4.6 G/DL
ALP BLD-CCNC: 45 U/L
ALT SERPL-CCNC: 20 U/L
ANION GAP SERPL CALC-SCNC: 12 MMOL/L
AST SERPL-CCNC: 20 U/L
BASOPHILS # BLD AUTO: 0.08 K/UL
BASOPHILS NFR BLD AUTO: 0.8 %
BILIRUB SERPL-MCNC: 0.6 MG/DL
BUN SERPL-MCNC: 14 MG/DL
CALCIUM SERPL-MCNC: 9.5 MG/DL
CHLORIDE SERPL-SCNC: 101 MMOL/L
CO2 SERPL-SCNC: 24 MMOL/L
CREAT SERPL-MCNC: 0.64 MG/DL
EGFR: 117 ML/MIN/1.73M2
EOSINOPHIL # BLD AUTO: 0.41 K/UL
EOSINOPHIL NFR BLD AUTO: 4 %
GLUCOSE SERPL-MCNC: 80 MG/DL
HCT VFR BLD CALC: 45 %
HGB BLD-MCNC: 14.4 G/DL
IMM GRANULOCYTES NFR BLD AUTO: 0.3 %
LYMPHOCYTES # BLD AUTO: 2.71 K/UL
LYMPHOCYTES NFR BLD AUTO: 26.6 %
MAN DIFF?: NORMAL
MCHC RBC-ENTMCNC: 29.2 PG
MCHC RBC-ENTMCNC: 32 GM/DL
MCV RBC AUTO: 91.3 FL
MONOCYTES # BLD AUTO: 0.61 K/UL
MONOCYTES NFR BLD AUTO: 6 %
NEUTROPHILS # BLD AUTO: 6.34 K/UL
NEUTROPHILS NFR BLD AUTO: 62.3 %
PLATELET # BLD AUTO: 277 K/UL
POTASSIUM SERPL-SCNC: 4.3 MMOL/L
PROT SERPL-MCNC: 7.3 G/DL
RBC # BLD: 4.93 M/UL
RBC # FLD: 12.7 %
SODIUM SERPL-SCNC: 137 MMOL/L
WBC # FLD AUTO: 10.18 K/UL

## 2022-08-08 ENCOUNTER — RESULT REVIEW (OUTPATIENT)
Age: 37
End: 2022-08-08

## 2022-08-08 PROCEDURE — 57520 CONIZATION OF CERVIX: CPT

## 2022-09-30 ENCOUNTER — APPOINTMENT (OUTPATIENT)
Dept: OBGYN | Facility: CLINIC | Age: 37
End: 2022-09-30

## 2022-09-30 VITALS
RESPIRATION RATE: 12 BRPM | WEIGHT: 155 LBS | HEART RATE: 86 BPM | DIASTOLIC BLOOD PRESSURE: 74 MMHG | SYSTOLIC BLOOD PRESSURE: 120 MMHG | HEIGHT: 65 IN | OXYGEN SATURATION: 98 % | BODY MASS INDEX: 25.83 KG/M2

## 2022-09-30 DIAGNOSIS — Z87.410 PERSONAL HISTORY OF CERVICAL DYSPLASIA: ICD-10-CM

## 2022-09-30 PROCEDURE — 99024 POSTOP FOLLOW-UP VISIT: CPT

## 2022-09-30 NOTE — PLAN
[FreeTextEntry1] : The patient is doing very well approximately 6 weeks postoperatively from a LEEP procedure.  She may resume all activities including intercourse.  Follow-up for Pap smear at 6 months.

## 2023-03-31 ENCOUNTER — APPOINTMENT (OUTPATIENT)
Dept: OBGYN | Facility: CLINIC | Age: 38
End: 2023-03-31
Payer: COMMERCIAL

## 2023-03-31 VITALS
OXYGEN SATURATION: 98 % | SYSTOLIC BLOOD PRESSURE: 118 MMHG | WEIGHT: 153 LBS | HEART RATE: 86 BPM | BODY MASS INDEX: 24.59 KG/M2 | HEIGHT: 66 IN | DIASTOLIC BLOOD PRESSURE: 72 MMHG | RESPIRATION RATE: 16 BRPM

## 2023-03-31 DIAGNOSIS — Z12.4 ENCOUNTER FOR SCREENING FOR MALIGNANT NEOPLASM OF CERVIX: ICD-10-CM

## 2023-03-31 DIAGNOSIS — Z12.39 ENCOUNTER FOR OTHER SCREENING FOR MALIGNANT NEOPLASM OF BREAST: ICD-10-CM

## 2023-03-31 DIAGNOSIS — Z87.09 PERSONAL HISTORY OF OTHER DISEASES OF THE RESPIRATORY SYSTEM: ICD-10-CM

## 2023-03-31 DIAGNOSIS — Z13.31 ENCOUNTER FOR SCREENING FOR DEPRESSION: ICD-10-CM

## 2023-03-31 DIAGNOSIS — Z01.419 ENCOUNTER FOR GYNECOLOGICAL EXAMINATION (GENERAL) (ROUTINE) W/OUT ABNORMAL FINDINGS: ICD-10-CM

## 2023-03-31 LAB
BILIRUB UR QL STRIP: NORMAL
CLARITY UR: CLEAR
COLLECTION METHOD: NORMAL
GLUCOSE UR-MCNC: NORMAL
HCG UR QL: 0.2 EU/DL
HCG UR QL: NEGATIVE
HGB UR QL STRIP.AUTO: NORMAL
KETONES UR-MCNC: NORMAL
LEUKOCYTE ESTERASE UR QL STRIP: NORMAL
NITRITE UR QL STRIP: NORMAL
PH UR STRIP: 6
PROT UR STRIP-MCNC: NORMAL
QUALITY CONTROL: YES
SP GR UR STRIP: 1.02

## 2023-03-31 PROCEDURE — 81003 URINALYSIS AUTO W/O SCOPE: CPT | Mod: QW

## 2023-03-31 PROCEDURE — 99395 PREV VISIT EST AGE 18-39: CPT | Mod: 25

## 2023-03-31 PROCEDURE — 90471 IMMUNIZATION ADMIN: CPT

## 2023-03-31 PROCEDURE — 81025 URINE PREGNANCY TEST: CPT

## 2023-03-31 PROCEDURE — 90651 9VHPV VACCINE 2/3 DOSE IM: CPT

## 2023-03-31 RX ORDER — CALCIUM CARBONATE 300MG(750)
0.4-32.5 TABLET,CHEWABLE ORAL DAILY
Qty: 90 | Refills: 1 | Status: ACTIVE | COMMUNITY
Start: 2023-03-31 | End: 1900-01-01

## 2023-03-31 RX ORDER — ETONOGESTREL AND ETHINYL ESTRADIOL .12; .015 MG/D; MG/D
INSERT, EXTENDED RELEASE VAGINAL
Refills: 0 | Status: DISCONTINUED | COMMUNITY
End: 2023-03-31

## 2023-03-31 NOTE — PLAN
[FreeTextEntry1] : I have spent 40 minutes of time on this encounter.  Greater than 50% of the face-to-face encounter time was spent on counseling and/or coordination of care for examination findings, differential, testing, management and planning. 10 minutes were allotted to discussing the depression screening.\par Yearly breast cancer screening with no current clinical or radiographic concerns.  The patient was reminded regarding future well breast and general healthcare, breast cancer risk reduction, the importance of self-examination and the need for follow up.   She was again reminded of the need to take Vit D3 2500  IU daily or to keep a Vit D level above 30, and Tumeric 1000 mg daily with Black Pepper.  Plan continued yearly imaging and breast follow up, sooner as needed.  I counseled the patient on current recommendations to reduce breast cancer risk including but not inclusive to regular exercise 20-30 minutes 3-4 times a week, low fat diet, limiting alcohol consumption, maintenance of ideal body weight, yearly imaging and self breast awareness.  Questions answered.  I encouraged in light of Covid 19, social distancing, frequent hand washing and precautions to stay healthy.\par \par The risks, benefits and alternatives to an OCP were discussed and all questions were answered.  Currently there are no contraindications to taking hormonal birthcontrol.  Instructions for usage and ASA for travel were discussed.\par Please schedule a follow-up appointment in 6 months.\par STD prevention was discussed with the patient.\par Risks, benefits, alternatives of hormonal contraceptive use were discussed with the patient including but not limited to risk of contraceptive failure, deep venous thrombosis or embolism, cardiovascular disease, sexually transmitted disease transmission without use of barrier method.\par Self breast exam instructions were discussed with the patient.\par HPV vaccination recommended and accepted.\par Vulvar fibroma discussed, observe

## 2023-03-31 NOTE — HISTORY OF PRESENT ILLNESS
[N] : Patient reports normal menses [Y] : Positive pregnancy history [Currently Active] : currently active [PapSmeardate] : 06/22 [LMPDate] : 03/24/23 [MensesFreq] : 28 [MensesLength] : 3 [TextBox_6] : 03/24/23 [TextBox_13] : 28 [TextBox_15] : 3 [FreeTextEntry1] : 03/24/23

## 2023-03-31 NOTE — COUNSELING
[Nutrition/ Exercise/ Weight Management] : nutrition, exercise, weight management [Body Image] : body image [Vitamins/Supplements] : vitamins/supplements [Sunscreen] : sunscreen [Breast Self Exam] : breast self exam [Bladder Hygiene] : bladder hygiene [HPV Vaccine] : HPV Vaccine Additional Progress Note...

## 2023-03-31 NOTE — PHYSICAL EXAM
[Appropriately responsive] : appropriately responsive [Alert] : alert [No Acute Distress] : no acute distress [No Lymphadenopathy] : no lymphadenopathy [No Murmurs] : no murmurs [Soft] : soft [Non-tender] : non-tender [Non-distended] : non-distended [No HSM] : No HSM [No Lesions] : no lesions [No Mass] : no mass [Oriented x3] : oriented x3 [Examination Of The Breasts] : a normal appearance [No Masses] : no breast masses were palpable [Labia Majora] : normal [Labia Minora] : normal [Normal] : normal [Uterine Adnexae] : normal [FreeTextEntry4] : Labial minora fibroma R approx 5mm

## 2023-04-03 LAB
HPV 16 E6+E7 MRNA CVX QL NAA+PROBE: NOT DETECTED
HPV18+45 E6+E7 MRNA CVX QL NAA+PROBE: NOT DETECTED

## 2023-04-04 LAB — CYTOLOGY CVX/VAG DOC THIN PREP: NORMAL

## 2023-05-24 ENCOUNTER — APPOINTMENT (OUTPATIENT)
Dept: OBGYN | Facility: CLINIC | Age: 38
End: 2023-05-24
Payer: COMMERCIAL

## 2023-05-24 LAB
HCG UR QL: NEGATIVE
QUALITY CONTROL: YES

## 2023-05-24 PROCEDURE — 81025 URINE PREGNANCY TEST: CPT

## 2023-09-20 ENCOUNTER — APPOINTMENT (OUTPATIENT)
Dept: OBGYN | Facility: CLINIC | Age: 38
End: 2023-09-20
Payer: COMMERCIAL

## 2023-09-20 DIAGNOSIS — Z23 ENCOUNTER FOR IMMUNIZATION: ICD-10-CM

## 2023-09-20 PROCEDURE — 90651 9VHPV VACCINE 2/3 DOSE IM: CPT

## 2023-09-20 PROCEDURE — 90471 IMMUNIZATION ADMIN: CPT

## 2023-10-11 ENCOUNTER — APPOINTMENT (OUTPATIENT)
Dept: OBGYN | Facility: CLINIC | Age: 38
End: 2023-10-11
Payer: COMMERCIAL

## 2023-10-11 VITALS
HEIGHT: 66 IN | RESPIRATION RATE: 16 BRPM | WEIGHT: 156 LBS | OXYGEN SATURATION: 99 % | DIASTOLIC BLOOD PRESSURE: 62 MMHG | SYSTOLIC BLOOD PRESSURE: 92 MMHG | BODY MASS INDEX: 25.07 KG/M2 | HEART RATE: 90 BPM

## 2023-10-11 DIAGNOSIS — Z30.41 ENCOUNTER FOR SURVEILLANCE OF CONTRACEPTIVE PILLS: ICD-10-CM

## 2023-10-11 DIAGNOSIS — Z98.890 OTHER SPECIFIED POSTPROCEDURAL STATES: ICD-10-CM

## 2023-10-11 LAB
BILIRUB UR QL STRIP: NORMAL
CLARITY UR: CLEAR
COLLECTION METHOD: NORMAL
GLUCOSE UR-MCNC: NORMAL
HCG UR QL: 0.2 EU/DL
HCG UR QL: NEGATIVE
HGB UR QL STRIP.AUTO: NORMAL
KETONES UR-MCNC: NORMAL
LEUKOCYTE ESTERASE UR QL STRIP: NORMAL
NITRITE UR QL STRIP: NORMAL
PH UR STRIP: 6
PROT UR STRIP-MCNC: NORMAL
QUALITY CONTROL: YES
SP GR UR STRIP: 1.01

## 2023-10-11 PROCEDURE — 81003 URINALYSIS AUTO W/O SCOPE: CPT | Mod: QW

## 2023-10-11 PROCEDURE — 99214 OFFICE O/P EST MOD 30 MIN: CPT | Mod: 25

## 2023-10-11 PROCEDURE — 81025 URINE PREGNANCY TEST: CPT

## 2023-10-11 RX ORDER — LEVONORGESTREL AND ETHINYL ESTRADIOL 6-5-10
50-30/75-40/ KIT ORAL DAILY
Qty: 6 | Refills: 0 | Status: ACTIVE | COMMUNITY
Start: 2022-06-24 | End: 1900-01-01

## 2023-10-12 LAB
SOURCE AMPLIFICATION: NORMAL
T VAGINALIS RRNA SPEC QL NAA+PROBE: NOT DETECTED

## 2023-10-13 DIAGNOSIS — B37.9 CANDIDIASIS, UNSPECIFIED: ICD-10-CM

## 2023-10-13 RX ORDER — FLUCONAZOLE 150 MG/1
150 TABLET ORAL
Qty: 2 | Refills: 0 | Status: ACTIVE | COMMUNITY
Start: 2023-10-13 | End: 1900-01-01

## 2023-10-16 LAB — CYTOLOGY CVX/VAG DOC THIN PREP: NORMAL

## 2023-10-24 DIAGNOSIS — Z12.39 ENCOUNTER FOR OTHER SCREENING FOR MALIGNANT NEOPLASM OF BREAST: ICD-10-CM

## 2023-10-26 ENCOUNTER — APPOINTMENT (OUTPATIENT)
Dept: OBGYN | Facility: CLINIC | Age: 38
End: 2023-10-26
Payer: COMMERCIAL

## 2023-10-26 PROCEDURE — 36415 COLL VENOUS BLD VENIPUNCTURE: CPT

## 2023-11-16 ENCOUNTER — NON-APPOINTMENT (OUTPATIENT)
Age: 38
End: 2023-11-16

## 2024-01-01 NOTE — DISCHARGE NOTE OB - NS AS DC AMI YN
Baby acne is a common cause of rash on cheeks, eczema is as well. No cream other than moisturizers would be recommended. If eczema is severe we do sometimes use otc hydrocortisone on face. If she has concerns that the rash is not simply baby acne please get him checked out.  
Caller: Johanny Rocha    Relationship: Mother    Best call back number: [phone]    What medication are you requesting: CREAM OR OINTMENT    What are your current symptoms: RASH ON FACE       If a prescription is needed, what is your preferred pharmacy and phone number:[id number] - [place], [place] - [address]  - [phone]  - [phone] FX       Additional notes: PER [employer] MESSAGE. PLEASE REFER BACK FOR IMAGES    Can we get a prescription of Triamcinolone Cream or ointment? He’s has a rash on his face.   
Pt mom informed. Verbalized good understanding and appreciation.   
no

## 2024-01-25 ENCOUNTER — NON-APPOINTMENT (OUTPATIENT)
Age: 39
End: 2024-01-25

## 2024-02-06 DIAGNOSIS — R30.0 DYSURIA: ICD-10-CM

## 2024-02-06 RX ORDER — PHENAZOPYRIDINE 200 MG/1
200 TABLET, FILM COATED ORAL 3 TIMES DAILY
Qty: 9 | Refills: 1 | Status: ACTIVE | COMMUNITY
Start: 2024-02-06 | End: 1900-01-01

## 2024-02-07 LAB
APPEARANCE: CLEAR
BACTERIA: NEGATIVE /HPF
BILIRUBIN URINE: NEGATIVE
BLOOD URINE: NEGATIVE
CAST: 0 /LPF
COLOR: YELLOW
EPITHELIAL CELLS: 0 /HPF
GLUCOSE QUALITATIVE U: NEGATIVE MG/DL
KETONES URINE: NEGATIVE MG/DL
LEUKOCYTE ESTERASE URINE: ABNORMAL
MICROSCOPIC-UA: NORMAL
NITRITE URINE: NEGATIVE
PH URINE: 6.5
PROTEIN URINE: NEGATIVE MG/DL
RED BLOOD CELLS URINE: 0 /HPF
SPECIFIC GRAVITY URINE: 1.01
UROBILINOGEN URINE: 0.2 MG/DL
WHITE BLOOD CELLS URINE: 1 /HPF

## 2024-02-08 LAB — BACTERIA UR CULT: NORMAL

## 2024-03-21 DIAGNOSIS — Z91.89 OTHER SPECIFIED PERSONAL RISK FACTORS, NOT ELSEWHERE CLASSIFIED: ICD-10-CM

## 2024-05-01 ENCOUNTER — NON-APPOINTMENT (OUTPATIENT)
Age: 39
End: 2024-05-01

## 2024-08-07 ENCOUNTER — TRANSCRIPTION ENCOUNTER (OUTPATIENT)
Age: 39
End: 2024-08-07

## 2024-08-13 ENCOUNTER — APPOINTMENT (OUTPATIENT)
Dept: OBGYN | Facility: CLINIC | Age: 39
End: 2024-08-13

## 2024-12-04 ENCOUNTER — NON-APPOINTMENT (OUTPATIENT)
Age: 39
End: 2024-12-04

## 2025-03-13 DIAGNOSIS — Z12.39 ENCOUNTER FOR OTHER SCREENING FOR MALIGNANT NEOPLASM OF BREAST: ICD-10-CM

## 2025-03-13 DIAGNOSIS — Z13.31 ENCOUNTER FOR SCREENING FOR DEPRESSION: ICD-10-CM

## 2025-03-13 DIAGNOSIS — Z12.31 ENCOUNTER FOR SCREENING MAMMOGRAM FOR MALIGNANT NEOPLASM OF BREAST: ICD-10-CM

## 2025-03-19 ENCOUNTER — APPOINTMENT (OUTPATIENT)
Dept: OBGYN | Facility: CLINIC | Age: 40
End: 2025-03-19
Payer: COMMERCIAL

## 2025-03-19 VITALS
HEART RATE: 81 BPM | SYSTOLIC BLOOD PRESSURE: 100 MMHG | BODY MASS INDEX: 24.91 KG/M2 | WEIGHT: 155 LBS | HEIGHT: 66 IN | RESPIRATION RATE: 14 BRPM | OXYGEN SATURATION: 98 % | DIASTOLIC BLOOD PRESSURE: 62 MMHG

## 2025-03-19 DIAGNOSIS — K64.8 OTHER HEMORRHOIDS: ICD-10-CM

## 2025-03-19 DIAGNOSIS — Z87.898 PERSONAL HISTORY OF OTHER SPECIFIED CONDITIONS: ICD-10-CM

## 2025-03-19 DIAGNOSIS — Z12.39 ENCOUNTER FOR OTHER SCREENING FOR MALIGNANT NEOPLASM OF BREAST: ICD-10-CM

## 2025-03-19 DIAGNOSIS — Z12.4 ENCOUNTER FOR SCREENING FOR MALIGNANT NEOPLASM OF CERVIX: ICD-10-CM

## 2025-03-19 DIAGNOSIS — Z30.41 ENCOUNTER FOR SURVEILLANCE OF CONTRACEPTIVE PILLS: ICD-10-CM

## 2025-03-19 DIAGNOSIS — Z80.3 FAMILY HISTORY OF MALIGNANT NEOPLASM OF BREAST: ICD-10-CM

## 2025-03-19 DIAGNOSIS — K64.4 RESIDUAL HEMORRHOIDAL SKIN TAGS: ICD-10-CM

## 2025-03-19 DIAGNOSIS — Z01.419 ENCOUNTER FOR GYNECOLOGICAL EXAMINATION (GENERAL) (ROUTINE) W/OUT ABNORMAL FINDINGS: ICD-10-CM

## 2025-03-19 DIAGNOSIS — Z82.49 FAMILY HISTORY OF ISCHEMIC HEART DISEASE AND OTHER DISEASES OF THE CIRCULATORY SYSTEM: ICD-10-CM

## 2025-03-19 LAB
HCG UR QL: NEGATIVE
QUALITY CONTROL: YES

## 2025-03-19 PROCEDURE — 99396 PREV VISIT EST AGE 40-64: CPT

## 2025-03-19 PROCEDURE — 81025 URINE PREGNANCY TEST: CPT

## 2025-03-19 PROCEDURE — 99459 PELVIC EXAMINATION: CPT

## 2025-03-19 RX ORDER — HYDROCORTISONE ACETATE AND PRAMOXINE HYDROCHLORIDE 25; 18 MG/1; MG/1
25-18 SUPPOSITORY RECTAL TWICE DAILY
Qty: 20 | Refills: 1 | Status: ACTIVE | COMMUNITY
Start: 2025-03-19 | End: 1900-01-01

## 2025-03-19 RX ORDER — HYDROCORTISONE 25 MG/G
2.5 CREAM TOPICAL 3 TIMES DAILY
Qty: 1 | Refills: 1 | Status: ACTIVE | COMMUNITY
Start: 2025-03-19 | End: 1900-01-01

## 2025-03-21 LAB — HPV HIGH+LOW RISK DNA PNL CVX: NOT DETECTED

## 2025-03-23 LAB — CYTOLOGY CVX/VAG DOC THIN PREP: NORMAL

## 2025-04-08 ENCOUNTER — APPOINTMENT (OUTPATIENT)
Dept: OBGYN | Facility: CLINIC | Age: 40
End: 2025-04-08
Payer: COMMERCIAL

## 2025-04-08 DIAGNOSIS — R30.0 DYSURIA: ICD-10-CM

## 2025-04-08 DIAGNOSIS — N39.0 URINARY TRACT INFECTION, SITE NOT SPECIFIED: ICD-10-CM

## 2025-04-08 PROCEDURE — 81003 URINALYSIS AUTO W/O SCOPE: CPT | Mod: QW

## 2025-04-09 LAB
APPEARANCE: CLEAR
BACTERIA: NEGATIVE /HPF
BILIRUBIN URINE: NEGATIVE
BLOOD URINE: NEGATIVE
CAST: NORMAL /LPF
COLOR: YELLOW
EPITHELIAL CELLS: 3 /HPF
GLUCOSE QUALITATIVE U: NEGATIVE MG/DL
KETONES URINE: NEGATIVE MG/DL
LEUKOCYTE ESTERASE URINE: ABNORMAL
MICROSCOPIC-UA: NORMAL
NITRITE URINE: POSITIVE
PH URINE: 7
PROTEIN URINE: NEGATIVE MG/DL
RED BLOOD CELLS URINE: NORMAL /HPF
REVIEW: NORMAL
SPECIFIC GRAVITY URINE: 1.01
UROBILINOGEN URINE: 0.2 MG/DL
WHITE BLOOD CELLS URINE: 2 /HPF

## 2025-04-10 PROBLEM — N39.0 URINARY TRACT INFECTION WITHOUT HEMATURIA, SITE UNSPECIFIED: Status: ACTIVE | Noted: 2025-04-10 | Resolved: 2025-05-10

## 2025-04-10 RX ORDER — NITROFURANTOIN (MONOHYDRATE/MACROCRYSTALS) 25; 75 MG/1; MG/1
100 CAPSULE ORAL TWICE DAILY
Qty: 10 | Refills: 0 | Status: ACTIVE | COMMUNITY
Start: 2025-04-10 | End: 1900-01-01

## 2025-04-11 ENCOUNTER — NON-APPOINTMENT (OUTPATIENT)
Age: 40
End: 2025-04-11

## 2025-04-11 LAB — BACTERIA UR CULT: ABNORMAL

## 2025-04-22 DIAGNOSIS — N90.7 VULVAR CYST: ICD-10-CM
